# Patient Record
Sex: FEMALE | Race: WHITE
[De-identification: names, ages, dates, MRNs, and addresses within clinical notes are randomized per-mention and may not be internally consistent; named-entity substitution may affect disease eponyms.]

---

## 2017-07-01 ENCOUNTER — HEALTH MAINTENANCE LETTER (OUTPATIENT)
Age: 62
End: 2017-07-01

## 2018-08-02 ENCOUNTER — TRANSFERRED RECORDS (OUTPATIENT)
Dept: HEALTH INFORMATION MANAGEMENT | Facility: CLINIC | Age: 63
End: 2018-08-02

## 2018-08-17 ENCOUNTER — RECORDS - HEALTHEAST (OUTPATIENT)
Dept: LAB | Facility: CLINIC | Age: 63
End: 2018-08-17

## 2018-08-17 LAB
C REACTIVE PROTEIN LHE: <0.1 MG/DL (ref 0–0.8)
CHOLEST SERPL-MCNC: 259 MG/DL
FASTING STATUS PATIENT QL REPORTED: YES
HDLC SERPL-MCNC: 71 MG/DL
LDLC SERPL CALC-MCNC: 171 MG/DL
TRIGL SERPL-MCNC: 87 MG/DL
TSH SERPL DL<=0.005 MIU/L-ACNC: 5.05 UIU/ML (ref 0.3–5)

## 2018-08-20 LAB — 25(OH)D3 SERPL-MCNC: 37.8 NG/ML (ref 30–80)

## 2018-09-10 NOTE — TELEPHONE ENCOUNTER
FUTURE VISIT INFORMATION      FUTURE VISIT INFORMATION:    Date: 9/18/18    Time: 1200    Location: [facility]  REFERRAL INFORMATION:    Referring provider:  N/A    Referring providers clinic:  N/A    Reason for visit/diagnosis  HAMMER TOE, BUNIONS    RECORDS REQUESTED FROM:       Clinic name Comments Records Status Imaging Status   [facility]10[facility]18@1220                                     RECORDS STATUS      RECORDS RECEIVED FROM: [facility]   DATE RECEIVED: 9/11/18   NOTES STATUS DETAILS   OFFICE NOTE from referring provider Received 8/6/18   OFFICE NOTE from other specialist N/A    DISCHARGE SUMMARY from hospital N/A    DISCHARGE REPORT from the ER N/A    OPERATIVE REPORT N/A    MEDICATION LIST N/A    IMPLANT RECORD/STICKER N/A    LABS     CBC/DIFF N/A    CULTURES N/A    INJECTIONS DONE IN RADIOLOGY N/A    MRI N/A    CT SCAN N/A    XRAYS (IMAGES & REPORTS) N/A    TUMOR     PATHOLOGY  Slides & report N/A

## 2018-09-12 DIAGNOSIS — M25.571 PAIN IN JOINT INVOLVING ANKLE AND FOOT, RIGHT: Primary | ICD-10-CM

## 2018-09-18 ENCOUNTER — PRE VISIT (OUTPATIENT)
Dept: ORTHOPEDICS | Facility: CLINIC | Age: 63
End: 2018-09-18

## 2018-11-21 ENCOUNTER — MEDICAL CORRESPONDENCE (OUTPATIENT)
Dept: HEALTH INFORMATION MANAGEMENT | Facility: CLINIC | Age: 63
End: 2018-11-21

## 2018-12-26 ENCOUNTER — MEDICAL CORRESPONDENCE (OUTPATIENT)
Dept: HEALTH INFORMATION MANAGEMENT | Facility: CLINIC | Age: 63
End: 2018-12-26

## 2018-12-26 ENCOUNTER — TRANSFERRED RECORDS (OUTPATIENT)
Dept: HEALTH INFORMATION MANAGEMENT | Facility: CLINIC | Age: 63
End: 2018-12-26

## 2019-01-02 DIAGNOSIS — Z13.6 SCREENING FOR CARDIOVASCULAR CONDITION: Primary | ICD-10-CM

## 2019-01-03 DIAGNOSIS — I65.29 OCCLUSION OF CAROTID ARTERY, UNSPECIFIED LATERALITY: Primary | ICD-10-CM

## 2019-01-03 DIAGNOSIS — R06.02 SHORTNESS OF BREATH: ICD-10-CM

## 2019-01-03 DIAGNOSIS — I70.90 ATHEROSCLEROSIS: ICD-10-CM

## 2019-01-03 DIAGNOSIS — I10 ESSENTIAL HYPERTENSION: ICD-10-CM

## 2019-01-07 ENCOUNTER — RECORDS - HEALTHEAST (OUTPATIENT)
Dept: ADMINISTRATIVE | Facility: OTHER | Age: 64
End: 2019-01-07

## 2019-01-07 ENCOUNTER — OFFICE VISIT (OUTPATIENT)
Dept: CARDIOLOGY | Facility: CLINIC | Age: 64
End: 2019-01-07
Payer: COMMERCIAL

## 2019-01-07 VITALS
HEART RATE: 69 BPM | BODY MASS INDEX: 23.21 KG/M2 | WEIGHT: 131 LBS | OXYGEN SATURATION: 96 % | DIASTOLIC BLOOD PRESSURE: 83 MMHG | SYSTOLIC BLOOD PRESSURE: 121 MMHG | HEIGHT: 63 IN

## 2019-01-07 DIAGNOSIS — Z13.6 SCREENING FOR CARDIOVASCULAR CONDITION: ICD-10-CM

## 2019-01-07 DIAGNOSIS — I70.90 ATHEROSCLEROSIS: ICD-10-CM

## 2019-01-07 DIAGNOSIS — I10 ESSENTIAL HYPERTENSION: ICD-10-CM

## 2019-01-07 DIAGNOSIS — I73.9 PERIPHERAL VASCULAR DISEASE (H): ICD-10-CM

## 2019-01-07 DIAGNOSIS — I65.29 OCCLUSION OF CAROTID ARTERY, UNSPECIFIED LATERALITY: ICD-10-CM

## 2019-01-07 DIAGNOSIS — I10 ESSENTIAL HYPERTENSION: Primary | ICD-10-CM

## 2019-01-07 DIAGNOSIS — R06.02 SHORTNESS OF BREATH: ICD-10-CM

## 2019-01-07 LAB
CHOLEST SERPL-MCNC: 184 MG/DL
CREAT UR-MCNC: 237 MG/DL
CRP SERPL HS-MCNC: 21.3 MG/L
GLUCOSE SERPL-MCNC: 95 MG/DL (ref 70–99)
HDLC SERPL-MCNC: 57 MG/DL
INTERPRETATION ECG - MUSE: NORMAL
LDLC SERPL CALC-MCNC: 105 MG/DL
MICROALBUMIN UR-MCNC: 23 MG/L
MICROALBUMIN/CREAT UR: 9.75 MG/G CR (ref 0–25)
NONHDLC SERPL-MCNC: 127 MG/DL
NT-PROBNP SERPL-MCNC: 84 PG/ML (ref 0–125)
TRIGL SERPL-MCNC: 112 MG/DL

## 2019-01-07 RX ORDER — LOSARTAN POTASSIUM 50 MG/1
50 TABLET ORAL DAILY
Refills: 0 | COMMUNITY
Start: 2018-12-21

## 2019-01-07 RX ORDER — AMLODIPINE BESYLATE 2.5 MG/1
2.5 TABLET ORAL DAILY
Refills: 0 | COMMUNITY
Start: 2018-12-26

## 2019-01-07 ASSESSMENT — MIFFLIN-ST. JEOR: SCORE: 1108.34

## 2019-01-07 NOTE — LETTER
2019      RE: Farrah Rausch  [address]  [place] [address]       Dear Colleague,    Thank you for the opportunity to participate in the care of your patient, Farrah Rausch, at the [facility] at [facility]. Please see a copy of my visit note below.    [facility] for Cardiovascular Disease Prevention - Exam Note    Active Problems   Patient Active Problem List    Diagnosis Date Noted     Carotid artery occlusion      Priority: Medium     Hypertension      Priority: Medium     Shortness of breath      Priority: Medium       Reason For Visit   Patient here for [facility] early detection of atherosclerosis and CVD exam.    Pain Evaluation  Current history of pain associated with this visit is: denied    HPI   Farrah Rausch is a 63 year old year old female with a history of childhood cancer (Hodgkins) age 4 with two series of radiation (one spring and one summer) the the upper chest and neck treated at [facility], carotid artery occlusion complete left side and 70-99% right side dx , hypothyroid, hyperlipidemia and hypertension. .Radiation has also effected her teeth with decay and she is getting dental care at [facility]. In the fall she added amlodipine 2.5 mg per day and losartan 50 mg per day with improvement in her numbers ( upt to 200/130) now in the  As low as 80/51 and as high as 144/96.     She had a chest CT in  with pulmonary nodules 7x6 not frankly malignant features and six additional 3 mm or less nodules. She has not yet followed up with the lung CT or carotid ultrasound. She continues to smoke 3 cigarettes per day even though she knows it is best to quit. She is not interested in nicotine replacement. She feels that she was traumatized by caring for her mother when she was ill and her brother both who have . She has seen counselors in the past. She does have shortness of breath with  exertion.    Nutrition assessment per patient report:   Foods with fat/cholesterol (fried foods, fatty meats, junk food):  1 serving per day , not much meat  Fruits and vegetables (  cup cooked, 1 cup raw):  1 serving per day  Caffeine (1 cup coffee, soda, etc):  3 servings per day coffee  Alcohol servings (12 oz. beer, 4 oz. wine, 1  oz. in mixed drink):  0 servings  Calcium servings (dairy foods, 8 oz. milk, yogurt, cheese, ice cream):  1.5 glass of milk, cheese and yogurt  Salt/sodium use:  rarely  Special dietary habits:  None    Activity  Patient is active around her home. She knows it would be good to walk more but she does not always find time to do this. She takes care of her own yard and lives in a house.    Laboratory Results Review  We discussed laboratory results today including lipids targets and how foods influence cholesterol.    Weight  Her perceived healthy weight is 120 pounds.  A normal BMI of 25 is equal to 141 pounds.  The current BMI of 23.2 is normal weight range.     PMH   Past Medical History:   Diagnosis Date     Carotid artery occlusion      Hypertension      Shortness of breath      Tobacco use        PSH  No past surgical history on file.    Current Meds   Current Outpatient Medications   Medication Sig Dispense Refill     Ergocalciferol (VITAMIN D2 PO)        levothyroxine (SYNTHROID) 125 MCG tablet Take 125 mcg by mouth daily       sertraline (ZOLOFT) 25 MG tablet Take 25 mg by mouth daily         Allergies      Allergies   Allergen Reactions     Thyroid      Valium [Diazepam]        Family Hx   No family history on file.    Social History  Farrah is retired.  She is single with no children.       Tobacco History  History   Smoking Status     Passive Smoke Exposure - Never Smoker     Types: Cigarettes   Smokeless Tobacco     Never Used       ROS  CONSTITUTIONAL:  No fever, chills, or sweats. No weight gain/loss.   EENT:  No visual disturbance, ear ache, epistaxis, sore  "throat  ALLERGIES/IMMUNOLOGIC:  Negative  RESPIRATORY:  No cough, hemoptysis  CARDIOVASCULAR:  As per HPI  GI:  No nausea, vomiting, hematemesis, melena  :  No urinary frequency, dysuria, or hematuria  INTEGUMENT:  Negative  PSYCHIATRIC:  Negative  NEURO:  Negative  ENDOCRINE:  Negative  MUSCULOSKELETAL:  Negative     Vital Signs   /83 (BP Location: Right arm, Patient Position: Sitting, Cuff Size: Adult Regular)   Pulse 69   Ht 1.6 m (5' 3\")   Wt 59.4 kg (131 lb)   SpO2 96%   BMI 23.21 kg/m         Waist: 31 inches  Hip: 37.5 inches    Physical Exam   In general, the patient is a pleasant female in no apparent distress.    HEENT: NC/AT.  PERRLA.  EOMI.  Sclerae white, not injected.  Nares clear.  Pharynx without erythema or exudate.  Dentition intact.    Neck: No adenopathy.  No thyromegaly.Carotids +4/4 bilaterally without bruits.  No jugular venous distension.   Lungs: CTA.  No ronchi, wheezes, rales.     Cor: RRR. Normal S1, S2 splits physiologically. No murmur, rub, click, or gallop. The PMI is in the 5th ICS in the midclavicular line. There is no heave.   Abdomen: Soft, nontender, nondistended. No organomegaly.  No bruits.   Extremities: No clubbing, cyanosis, or edema. The pulses are +2/2 at the post-tibial sites bilaterally. No bruits are noted.    Recent Labs  Lab Results   Component Value Date    GLC 95 01/07/2019      Lab Results   Component Value Date    NTBNP 84 01/07/2019     No results found for: NTBNPI   Lab Results   Component Value Date    UCRR 237 01/07/2019      Lab Results   Component Value Date    MICROL 23 01/07/2019      No results found for: MICROALBUMIN   Lab Results   Component Value Date    CRP 21.3 01/07/2019      Lab Results   Component Value Date    CHOL 184 01/07/2019      Lab Results   Component Value Date    TRIG 112 01/07/2019      Lab Results   Component Value Date    HDL 57 01/07/2019      Lab Results   Component Value Date     (H) 01/07/2019      No results " found for: VLDL   No results found for: CHOLHDLRATIO  Lab Results   Component Value Date    [facility] 127 01/07/2019          [facility] Test Results    BASIC SPIROMETRY: Summary of two attempts (see printout for details of results)  Results Estimated range for ht/age   FVC: 2.89 liter FVC: 1.50-2.89 liter   FEV1: 2.66 liter FEV1: 1.84-3.39 liter     History of asthma:  NO. + History of smoking.    History of respiratory infection current/recent:  NO    Spirometry Results:  normal      WALKING BLOOD PRESSURE RESPONSE (3 minute, 5 MET level walk)   Pre BP: 100/70 mmHg  3 min BP: 180/60 mmHg  1 min post BP: 160/70 mmHg    Pre HR: 76 bpm  3 min HR: 107 bpm  1 min post HR: 85 bpm   Test was stopped after 2 minutes walking. Test abnormal.     RETINAL VASCULAR ASSESSMENT   Left Eye Abnormality:  none  AV Ratio: 0.86    Right Eye Abnormality:  none  AV Ratio: 0.94     Retinal Assessment:  normal      ABDOMINAL AORTA ULTRASOUND (< 2.5 normal, borderline 2.5-2.9, abnormal > 3)   SupraIliac 2.24 cm    SupraRenal 2.08 cm    InfraRenal Proximal 2.01 cm    InfraRenal Distal 1.71 cm      Abdominal Aorta Assessment:  normal      LEFT VENTRICULAR ULTRASOUND MEASUREMENTS (adjusted for BSA)  LVIDD 43.4 mm   Septa 10.2 mm   Posterior 7.6 mm     Left Ventricular US Assessment:  normal      Carotid Artery IMT measurements report and plaques in the small area examined:   Left IMT N/A mm  Plaques left Common part of the artery and bulb and internal has a lot of disease. Was not able to perform any measurements.     Right IMT 1.328 mm  Plaques a lof of heterogeneous build up.        ECG (see tracing):  normal sinus rhythm;  rate: 69 bpm      Arterial Elasticity per age and gender (see printout):   C1 10.3 mL/mmHg x 10  normal   C2 2 mL/mmHg x 100 abnormal   Supine blood pressure: 128/79 mmHg       Assessment:     Cardiovascular:  Hx of left carotid occlusion, chest and neck radiation age 4 and ongoing tobacco use. ECG sinus rhythm rate 69. No  chest pain but she has had episodic shortness of breath more last summer. Nt pro bNP normal at 84. Some intermittant dizziness more when she is over dressed and too hot. She continues to use tobacco 3 cigarettes per day. She declined assistance with tobacco cessation at this time. She is not on aspirin therapy which may be helpful to reduce risk of vascular occlusions. Unable to visualize her carotid IMT today due to poor visibility related to radiation and plaque. Left per 2014 ultrasound CCA, ICA, ECA left occluded, 70-99% right internal stenosis. She says she does not want to have carotid surgery. No visible coronary calcification stated with chest CT in 2014. She has not had follow up for pulmonary nodules. Consideration of referral to pulmonary nodule clinic was mentioned in her last report. Recommend that she follow up with pulmonary nodule clinic, will discuss with her after team review obtaining CT before consultation with [facility].      Blood Pressure:  Sitting blood pressure with amlodipine 2.5 mg per day and losartan 50 mg per day at 121/83.  Exercise blood pressure significantly elevated consistent with low compliance. Some BP rise with exertion may also be related to vascular stenosis (carotid) and possibly other areas. Unable to visualize her carotid IMT today due to poor visibility related to radiation and plaque.    Lipids:  Her LDL was elevated at 171 8-2018 non fasting per her report. Today's LDL with change from whole milk to 2% is lower at 105, HDL 57 and triglyceride 112.    Health Habit Summary:  Nutrition: Heart Healthy Eating:  some of the time   Exercise:  not regularly active  Weight:  normal weight range  Tobacco Use:  3 cigarettes per day.    Full report to follow prevention team review of test results with scanned final report.    Time spent for patient visit was 60 minutes with more than half the time spent on counseling and coordination of care.    ANUJA Stacy CNP       CC  Patient  Care Team:  Kerry Wahl MD as PCP - General (Family Practice)  Clinic, [facility]  [place] DUMONT

## 2019-01-07 NOTE — PROGRESS NOTES
[facility] for Cardiovascular Disease Prevention - Exam Note    Active Problems   Patient Active Problem List    Diagnosis Date Noted     Carotid artery occlusion      Priority: Medium     Hypertension      Priority: Medium     Shortness of breath      Priority: Medium       Reason For Visit   Patient here for [facility] early detection of atherosclerosis and CVD exam.    Pain Evaluation  Current history of pain associated with this visit is: denied    HPI   Farrah Rausch is a 63 year old year old female with a history of childhood cancer (Hodgkins) age 4 with two series of radiation (one spring and one summer) the the upper chest and neck treated at [place], carotid artery occlusion complete left side and 70-99% right side dx 2013, hypothyroid, hyperlipidemia and hypertension. .Radiation has also effected her teeth with decay and she is getting dental care at [facility]. In the fall she added amlodipine 2.5 mg per day and losartan 50 mg per day with improvement in her numbers ( upt to 200/130) now in the  As low as 80/51 and as high as 144/96.     She had a chest CT in  with pulmonary nodules 7x6 not frankly malignant features and six additional 3 mm or less nodules. She has not yet followed up with the lung CT or carotid ultrasound. She continues to smoke 3 cigarettes per day even though she knows it is best to quit. She is not interested in nicotine replacement. She feels that she was traumatized by caring for her mother when she was ill and her brother both who have . She has seen counselors in the past. She does have shortness of breath with exertion.    Nutrition assessment per patient report:   Foods with fat/cholesterol (fried foods, fatty meats, junk food):  1 serving per day , not much meat  Fruits and vegetables (  cup cooked, 1 cup raw):  1 serving per day  Caffeine (1 cup coffee, soda, etc):  3 servings per day coffee  Alcohol servings (12 oz. beer, 4 oz. wine, 1  oz. in mixed drink):   0 servings  Calcium servings (dairy foods, 8 oz. milk, yogurt, cheese, ice cream):  1.5 glass of milk, cheese and yogurt  Salt/sodium use:  rarely  Special dietary habits:  None    Activity  Patient is active around her home. She knows it would be good to walk more but she does not always find time to do this. She takes care of her own yard and lives in a house.    Laboratory Results Review  We discussed laboratory results today including lipids targets and how foods influence cholesterol.    Weight  Her perceived healthy weight is 120 pounds.  A normal BMI of 25 is equal to 141 pounds.  The current BMI of 23.2 is normal weight range.     PMH   Past Medical History:   Diagnosis Date     Carotid artery occlusion      Hypertension      Shortness of breath      Tobacco use        PSH  No past surgical history on file.    Current Meds   Current Outpatient Medications   Medication Sig Dispense Refill     Ergocalciferol (VITAMIN D2 PO)        levothyroxine (SYNTHROID) 125 MCG tablet Take 125 mcg by mouth daily       sertraline (ZOLOFT) 25 MG tablet Take 25 mg by mouth daily         Allergies      Allergies   Allergen Reactions     Thyroid      Valium [Diazepam]        Family Hx   No family history on file.    Social History  Farrah is retired.  She is single with no children.       Tobacco History  History   Smoking Status     Passive Smoke Exposure - Never Smoker     Types: Cigarettes   Smokeless Tobacco     Never Used       ROS  CONSTITUTIONAL:  No fever, chills, or sweats. No weight gain/loss.   EENT:  No visual disturbance, ear ache, epistaxis, sore throat  ALLERGIES/IMMUNOLOGIC:  Negative  RESPIRATORY:  No cough, hemoptysis  CARDIOVASCULAR:  As per HPI  GI:  No nausea, vomiting, hematemesis, melena  :  No urinary frequency, dysuria, or hematuria  INTEGUMENT:  Negative  PSYCHIATRIC:  Negative  NEURO:  Negative  ENDOCRINE:  Negative  MUSCULOSKELETAL:  Negative     Vital Signs   /83 (BP Location: Right arm,  "Patient Position: Sitting, Cuff Size: Adult Regular)   Pulse 69   Ht 1.6 m (5' 3\")   Wt 59.4 kg (131 lb)   SpO2 96%   BMI 23.21 kg/m        Waist: 31 inches  Hip: 37.5 inches    Physical Exam   In general, the patient is a pleasant female in no apparent distress.    HEENT: NC/AT.  PERRLA.  EOMI.  Sclerae white, not injected.  Nares clear.  Pharynx without erythema or exudate.  Dentition intact.    Neck: No adenopathy.  No thyromegaly.Carotids +4/4 bilaterally without bruits.  No jugular venous distension.   Lungs: CTA.  No ronchi, wheezes, rales.     Cor: RRR. Normal S1, S2 splits physiologically. No murmur, rub, click, or gallop. The PMI is in the 5th ICS in the midclavicular line. There is no heave.   Abdomen: Soft, nontender, nondistended. No organomegaly.  No bruits.   Extremities: No clubbing, cyanosis, or edema. The pulses are +2/2 at the post-tibial sites bilaterally. No bruits are noted.    Recent Labs  Lab Results   Component Value Date    GLC 95 01/07/2019      Lab Results   Component Value Date    NTBNP 84 01/07/2019     No results found for: NTBNPI   Lab Results   Component Value Date    UCRR 237 01/07/2019      Lab Results   Component Value Date    MICROL 23 01/07/2019      No results found for: MICROALBUMIN   Lab Results   Component Value Date    CRP 21.3 01/07/2019      Lab Results   Component Value Date    CHOL 184 01/07/2019      Lab Results   Component Value Date    TRIG 112 01/07/2019      Lab Results   Component Value Date    HDL 57 01/07/2019      Lab Results   Component Value Date     (H) 01/07/2019      No results found for: VLDL   No results found for: CHOLHDLRATIO  Lab Results   Component Value Date    NHDL 127 01/07/2019          [facility] Test Results    BASIC SPIROMETRY: Summary of two attempts (see printout for details of results)  Results Estimated range for ht/age   FVC: 2.89 liter FVC: 1.50-2.89 liter   FEV1: 2.66 liter FEV1: 1.84-3.39 liter     History of asthma:  NO. + " History of smoking.    History of respiratory infection current/recent:  NO    Spirometry Results:  normal      WALKING BLOOD PRESSURE RESPONSE (3 minute, 5 MET level walk)   Pre BP: 100/70 mmHg  3 min BP: 180/60 mmHg  1 min post BP: 160/70 mmHg    Pre HR: 76 bpm  3 min HR: 107 bpm  1 min post HR: 85 bpm   Test was stopped after 2 minutes walking. Test abnormal.     RETINAL VASCULAR ASSESSMENT   Left Eye Abnormality:  none  AV Ratio: 0.86    Right Eye Abnormality:  none  AV Ratio: 0.94     Retinal Assessment:  normal      ABDOMINAL AORTA ULTRASOUND (< 2.5 normal, borderline 2.5-2.9, abnormal > 3)   SupraIliac 2.24 cm    SupraRenal 2.08 cm    InfraRenal Proximal 2.01 cm    InfraRenal Distal 1.71 cm      Abdominal Aorta Assessment:  normal      LEFT VENTRICULAR ULTRASOUND MEASUREMENTS (adjusted for BSA)  LVIDD 43.4 mm   Septa 10.2 mm   Posterior 7.6 mm     Left Ventricular US Assessment:  normal      Carotid Artery IMT measurements report and plaques in the small area examined:   Left IMT N/A mm  Plaques left Common part of the artery and bulb and internal has a lot of disease. Was not able to perform any measurements.     Right IMT 1.328 mm  Plaques a lof of heterogeneous build up.        ECG (see tracing):  normal sinus rhythm;  rate: 69 bpm      Arterial Elasticity per age and gender (see printout):   C1 10.3 mL/mmHg x 10  normal   C2 2 mL/mmHg x 100 abnormal   Supine blood pressure: 128/79 mmHg       Assessment:     Cardiovascular:  Hx of left carotid occlusion, chest and neck radiation age 4 and ongoing tobacco use. ECG sinus rhythm rate 69. No chest pain but she has had episodic shortness of breath more last summer. Nt pro bNP normal at 84. Some intermittant dizziness more when she is over dressed and too hot. She continues to use tobacco 3 cigarettes per day. She declined assistance with tobacco cessation at this time. She is not on aspirin therapy which may be helpful to reduce risk of vascular occlusions.  Unable to visualize her carotid IMT today due to poor visibility related to radiation and plaque. Left per 2014 ultrasound CCA, ICA, ECA left occluded, 70-99% right internal stenosis. She says she does not want to have carotid surgery. No visible coronary calcification stated with chest CT in 2014. She has not had follow up for pulmonary nodules. Consideration of referral to pulmonary nodule clinic was mentioned in her last report. Recommend that she follow up with pulmonary nodule clinic, will discuss with her after team review obtaining CT before consultation with [facility].      Blood Pressure:  Sitting blood pressure with amlodipine 2.5 mg per day and losartan 50 mg per day at 121/83.  Exercise blood pressure significantly elevated consistent with low compliance. Some BP rise with exertion may also be related to vascular stenosis (carotid) and possibly other areas. Unable to visualize her carotid IMT today due to poor visibility related to radiation and plaque.    Lipids:  Her LDL was elevated at 171 8-2018 non fasting per her report. Today's LDL with change from whole milk to 2% is lower at 105, HDL 57 and triglyceride 112.    Health Habit Summary:  Nutrition: Heart Healthy Eating:  some of the time   Exercise:  not regularly active  Weight:  normal weight range  Tobacco Use:  3 cigarettes per day.    Full report to follow prevention team review of test results with scanned final report.    Time spent for patient visit was 60 minutes with more than half the time spent on counseling and coordination of care.    ANUJA Stacy CNP       CC  Patient Care Team:  Kerry Wahl MD as PCP - General (Family Practice)  Clinic, [facility]  [place]

## 2019-01-18 ENCOUNTER — TELEPHONE (OUTPATIENT)
Dept: CARDIOLOGY | Facility: CLINIC | Age: 64
End: 2019-01-18

## 2019-01-18 DIAGNOSIS — R91.8 PULMONARY NODULES: Primary | ICD-10-CM

## 2019-01-18 NOTE — TELEPHONE ENCOUNTER
Discussed treatment with a statin which she declines. She did agree to follow up with pulmonary nodule clinic. Phone number provided.

## 2019-01-29 ENCOUNTER — TELEPHONE (OUTPATIENT)
Dept: SURGERY | Facility: CLINIC | Age: 64
End: 2019-01-29

## 2019-01-29 ENCOUNTER — TELEPHONE (OUTPATIENT)
Dept: PULMONOLOGY | Facility: CLINIC | Age: 64
End: 2019-01-29

## 2019-01-29 DIAGNOSIS — R91.1 LUNG NODULE: Primary | ICD-10-CM

## 2019-01-29 NOTE — TELEPHONE ENCOUNTER
Left message for patient to call and schedule Ct per order & Inbasket prior to visit w/ Dr. Tapia

## 2019-01-29 NOTE — TELEPHONE ENCOUNTER
ONCOLOGY INTAKE: Records Information      APPT INFORMATION: 02/20 w/Dr. Tapia at [facility] okay per pt for later date  Referring provider:  Reena Martínez APRN CNP  Referring provider s clinic:  NA  Reason for visit/diagnosis:  Pulmonary nodules [R91.8]:    Were the records received with the referral (via [employer])? No     Has patient been seen for any external appt for this diagnosis (enter clinic/location)? Yes / [facility]     Dx:Pulmonary nodules [R91.8]: Caller pt: Ref by:Reena Martínez APRN CNP: CT at [facility] 2014 No Bx  Okay per pt to schedule for later date

## 2019-01-29 NOTE — TELEPHONE ENCOUNTER
RECORDS STATUS - ALL OTHER DIAGNOSIS      RECORDS RECEIVED FROM: [facility]   DATE RECEIVED:    NOTES STATUS DETAILS   OFFICE NOTE from referring provider     OFFICE NOTE from medical oncologist     DISCHARGE SUMMARY from hospital     DISCHARGE REPORT from the ER     OPERATIVE REPORT     MEDICATION LIST     CLINICAL TRIAL TREATMENTS TO DATE     LABS     PATHOLOGY REPORTS     ANYTHING RELATED TO DIAGNOSIS     GENONOMIC TESTING     TYPE:     IMAGING (NEED IMAGES & REPORT)     CT SCANS 9/22/2014 ([facility], Requested) Called pt 1/29/19 and left VM - Pt needs to give verbal permission to [facility] for them to push the img to us. Left ph # for [facility], and advised pt to speak w/ Cassidy.   MRI     MAMMO     ULTRASOUND     PET

## 2019-01-29 NOTE — TELEPHONE ENCOUNTER
Per IB - I've ordered CT; I've requested 2014 lung imaging from [facility] [phone] medical records, but they need patient to call them to authorize pushing those images to us (?).         Please call patient with this information, and tell her that seeing MD with no updated imaging will not be very useful but if she insists, that is OK.   If able to get CT scheduled later in day, please do because we will be needing this eventually.     Called [facility], spoke w/ Cassidy. Per Cassidy, pt hasn't been seen since 9/22/14 & needs to call them to give a verbal authorization for them to push img to us. I left VM @ pt's phone advising them that [facility] needs verbal permission, advised pt to call [phone] & ask for Cassidy, and gave pt DOS of 9/22/14, and left her w/ my direct line as well, in case she has any questions.  2:53 PM

## 2019-01-31 ENCOUNTER — DOCUMENTATION ONLY (OUTPATIENT)
Dept: CARE COORDINATION | Facility: CLINIC | Age: 64
End: 2019-01-31

## 2019-02-14 ENCOUNTER — ANCILLARY PROCEDURE (OUTPATIENT)
Dept: CT IMAGING | Facility: CLINIC | Age: 64
End: 2019-02-14
Payer: MEDICARE

## 2019-02-14 DIAGNOSIS — R91.1 LUNG NODULE: ICD-10-CM

## 2019-02-20 ENCOUNTER — PRE VISIT (OUTPATIENT)
Dept: PULMONOLOGY | Facility: CLINIC | Age: 64
End: 2019-02-20

## 2019-03-04 NOTE — TELEPHONE ENCOUNTER
RECORDS STATUS - ALL OTHER DIAGNOSIS      RECORDS RECEIVED FROM: [facility]   DATE RECEIVED: 3/4/19   NOTES STATUS DETAILS   OFFICE NOTE from referring provider  Epic   OFFICE NOTE from medical oncologist NA    DISCHARGE SUMMARY from hospital NA    DISCHARGE REPORT from the ER NA    OPERATIVE REPORT NA    MEDICATION LIST  [facility]   CLINICAL TRIAL TREATMENTS TO DATE NA    LABS     PATHOLOGY REPORTS NA    ANYTHING RELATED TO DIAGNOSIS  Epic   GENONOMIC TESTING     TYPE: NA    IMAGING (NEED IMAGES & REPORT)     CT SCANS 2/14/19, 9/22/14 PACS   MRI NA    MAMMO NA    ULTRASOUND 9/22/14 PACS   PET NA

## 2019-03-06 ENCOUNTER — PRE VISIT (OUTPATIENT)
Dept: PULMONOLOGY | Facility: CLINIC | Age: 64
End: 2019-03-06

## 2019-03-06 ENCOUNTER — OFFICE VISIT (OUTPATIENT)
Dept: SURGERY | Facility: CLINIC | Age: 64
End: 2019-03-06
Attending: THORACIC SURGERY (CARDIOTHORACIC VASCULAR SURGERY)
Payer: MEDICARE

## 2019-03-06 ENCOUNTER — RECORDS - HEALTHEAST (OUTPATIENT)
Dept: ADMINISTRATIVE | Facility: OTHER | Age: 64
End: 2019-03-06

## 2019-03-06 VITALS
TEMPERATURE: 97 F | WEIGHT: 129.2 LBS | OXYGEN SATURATION: 99 % | SYSTOLIC BLOOD PRESSURE: 120 MMHG | HEART RATE: 87 BPM | HEIGHT: 63 IN | DIASTOLIC BLOOD PRESSURE: 78 MMHG | RESPIRATION RATE: 16 BRPM | BODY MASS INDEX: 22.89 KG/M2

## 2019-03-06 DIAGNOSIS — Z72.0 TOBACCO USE: ICD-10-CM

## 2019-03-06 DIAGNOSIS — Z12.2 ENCOUNTER FOR SCREENING FOR LUNG CANCER: ICD-10-CM

## 2019-03-06 DIAGNOSIS — R91.1 PULMONARY NODULE: Primary | ICD-10-CM

## 2019-03-06 PROCEDURE — G0463 HOSPITAL OUTPT CLINIC VISIT: HCPCS | Mod: ZF

## 2019-03-06 RX ORDER — HYDROCHLOROTHIAZIDE 12.5 MG/1
CAPSULE ORAL PRN
Refills: 0 | COMMUNITY
Start: 2018-11-20

## 2019-03-06 ASSESSMENT — MIFFLIN-ST. JEOR: SCORE: 1095.05

## 2019-03-06 ASSESSMENT — PAIN SCALES - GENERAL: PAINLEVEL: NO PAIN (0)

## 2019-03-06 NOTE — PROGRESS NOTES
LUNG NODULE & INTERVENTIONAL PULMONARY CLINIC  CLINICS & SURGERY CENTER, [facility], [facility]     Farrah Rausch MRN# [id number]   Age: 64 year old YOB: 1955     Reason for Consultation: lung nodule(s)    Requesting Physician: Nilam Paredes MD  No address on file       Assessment and Plan:    1. Established solitary pulmonary lung nodule(s). Given the characteristics on current/previous imaging and risk factors; I would classify this to be Low (<6%) risk for cancer. Farrah also has several small granulomas that are calcified.    2. Tobacco use: currently smoking.  Not interested in quitting.    3. You qualify for lung cancer screening.  We can start next year.    Lung Cancer Screening Shared Decision Making Visit     Farrah Rausch is eligible for lung cancer screening on the basis of:   has not not experienced symptoms suggestive of lung cancer.   born on 1955, 64 year old years old.   smoked > 30 pack-years   has not quit smoking is currently smoking.    I have discussed with patient the risks and benefits of screening for lung cancer with low-dose CT.     The risks include:   radiation exposure    false positives     over-diagnosis    The benefit of early detection of lung cancer is contingent upon adherence to annual screening or more frequent follow up if indicated.     Furthermore, reaping the benefits of screening requires Farrah Rausch to be willing and able to undergo diagnostic procedures, if indicated.     We did discuss that the only way to prevent lung cancer is to not smoke. Smoking cessation assistance was offered.             History:     Farrah Rausch is a 64 year old female with sig h/o for radiation therapy as a child for lymphoma who is here for evaluation/followup of lung nodule(s).  No provocative, palliative or localizing factors.  No pain.    - No new resp sx or complaints. Denies dyspnea or cough.   - Personal hx of cancer:  lymphoma  - Tobacco hx: Current Smoker: occasional for years up to currently.  >30 pack years  - My interpretation of the images relevant for this visit includes: small LLL lung nodule.  Stable relative to previous CT scan.     Culprit Nodule(s):   1: Left lower lobe nodule and is 6x7 mm in size/severity and non-calcified in morphology/quality. First seen by chest CT on 2014. There is no interval change.    Other nodules:   1: multiple small granulomata           Past Medical History:      Past Medical History:   Diagnosis Date     Carotid artery occlusion      H/O bilateral breast implants      Hyperlipidemia      Hypertension 2015     Shortness of breath      Stress disorder, post traumatic     caring for mother and brother and there eventual deaths.     Tobacco use            Past Surgical History:    No past surgical history on file.       Social History:     Social History     Tobacco Use     Smoking status: Passive Smoke Exposure - Never Smoker     Smokeless tobacco: Never Used   Substance Use Topics     Alcohol use: Yes     Comment: rarely          Family History:     Family History   Problem Relation Age of Onset     Pulmonary Embolism Mother      Cerebrovascular Disease Mother      Lung Cancer Father         hx of tobacco,      Schizophrenia Brother      Cancer Maternal Grandfather         ? cancer     Hypertension Brother         lives in [place]           Allergies:      Allergies   Allergen Reactions     Lisinopril      Knees buckleled, dizzy     Thyroid      Generic palpitations     Valium [Diazepam] Nausea and Vomiting          Medications:     Current Outpatient Medications   Medication Sig     amLODIPine (NORVASC) 2.5 MG tablet Take 2.5 mg by mouth daily     hydrochlorothiazide (MICROZIDE) 12.5 MG capsule as needed     levothyroxine (SYNTHROID) 125 MCG tablet Take 125 mcg by mouth daily     losartan (COZAAR) 50 MG tablet Take 50 mg by mouth daily     Ergocalciferol (VITAMIN D2 PO) Take 10,000 Units  by mouth once a week      No current facility-administered medications for this visit.           Review of Systems:     CONSTITUTIONAL: negative for fever, chills, change in weight  INTEGUMENTARY/SKIN: no rash or obvious new lesions  ENT/MOUTH: no sore throat, new sinus pain or nasal drainage  RESP: see interval history  CV: negative for chest pain, palpitations or peripheral edema  GI: no nausea, vomiting, change in stools  : no dysuria  MUSCULOSKELETAL: no myalgias, arthralgias  ENDOCRINE: blood sugars with adequate control  PSYCHIATRIC: mood stable  LYMPHATIC: no new lymphadenopathy  HEME: no bleeding or easy bruisability  NEURO: no numbness, weakness, headaches         Physical Exam:     Temp:  [97  F (36.1  C)] 97  F (36.1  C)  Pulse:  [87] 87  Resp:  [16] 16  BP: (120)/(78) 120/78  SpO2:  [99 %] 99 %  Wt Readings from Last 4 Encounters:   03/06/19 58.6 kg (129 lb 3.2 oz)   01/07/19 59.4 kg (131 lb)     Constitutional:   Awake, alert and in no apparent distress     Eyes:   Nonicteric, MORIAH     ENT:    Trachea is midline. No gross neck abnormalities, poor dentition 2/2 childhood.     Neck:   Supple without supraclavicular or cervical lymphadenopathy     Lungs:   Good air flow.  No crackles. No rhonchi.  No wheezes.     Cardiovascular:   Normal S1 and S2.  RRR.  No murmur, gallop or rub.  Radial, DP and PT pulses normal and symmetric     Abdomen:   NABS, soft, nontender, nondistended.  No HSM.     Musculoskeletal:   No edema.      Neurologic:   Alert and conversant. Cranial nerves  intact.       Skin:   Warm, dry.  No rash on limited exam.           Current Laboratory Data:   All laboratory and imaging data reviewed.    No results found for this or any previous visit (from the past 24 hour(s)).         Previous Pulmonary Function Testing   No results found for: 20001  No results found for: 20002  No results found for: 20003  No results found for: 20015  No results found for: 20016  No results found for:  20027  No results found for: 20028  No results found for: 20029  No results found for: 20079  No results found for: 20080  No results found for: 20081  No results found for: 20335  No results found for: 20105  No results found for: 20053  No results found for: 20054  No results found for: 20055         Previous Chest Imaging   CT scans from 2014 and recently interpreted by me.  Both have approx 6-7 mm RLL nodule.  Stable.  Small granulomata.         Previous Cardiology Imaging   No results found for this or any previous visit (from the past 8760 hour(s)).

## 2019-03-06 NOTE — NURSING NOTE
"Oncology Rooming Note    March 6, 2019 1:05 PM   Farrah Rausch is a 64 year old female who presents for:    Chief Complaint   Patient presents with     Oncology Clinic Visit     new pt pulmonary nodules     Initial Vitals: /78 (BP Location: Right arm, Patient Position: Sitting, Cuff Size: Adult Regular)   Pulse 87   Temp 97  F (36.1  C) (Oral)   Resp 16   Ht 1.6 m (5' 2.99\")   Wt 58.6 kg (129 lb 3.2 oz)   SpO2 99%   BMI 22.89 kg/m   Estimated body mass index is 22.89 kg/m  as calculated from the following:    Height as of this encounter: 1.6 m (5' 2.99\").    Weight as of this encounter: 58.6 kg (129 lb 3.2 oz). Body surface area is 1.61 meters squared.  No Pain (0) Comment: Data Unavailable   No LMP recorded.  Allergies reviewed: Yes  Medications reviewed: Yes    Medications: Medication refills not needed today.  Pharmacy name entered into [facility]: [facility] - [place], [place] - [address]    Clinical concerns: none        Sheila Clemente, CMA              "

## 2019-03-06 NOTE — LETTER
RE: Farrah Rausch  [address]  [place] [address]     Dear Colleague,    Thank you for referring your patient, Farrah Rausch, to the [facility]. Please see a copy of my visit note below.    LUNG NODULE & INTERVENTIONAL PULMONARY CLIN[facility]     Farrah Rausch MRN# [id number]   Age: 64 year old YOB: 1955     Reason for Consultation: lung nodule(s)    Requesting Physician: Referred Self, MD  No address on file       Assessment and Plan:    1. Established solitary pulmonary lung nodule(s). Given the characteristics on current/previous imaging and risk factors; I would classify this to be Low (<6%) risk for cancer. Farrah also has several small granulomas that are calcified.    2. Tobacco use: currently smoking.  Not interested in quitting.    3. You qualify for lung cancer screening.  We can start next year.    Lung Cancer Screening Shared Decision Making Visit     Farrah Rausch is eligible for lung cancer screening on the basis of:   has not not experienced symptoms suggestive of lung cancer.   born on 1955, 64 year old years old.   smoked > 30 pack-years   has not quit smoking is currently smoking.    I have discussed with patient the risks and benefits of screening for lung cancer with low-dose CT.     The risks include:   radiation exposure    false positives     over-diagnosis    The benefit of early detection of lung cancer is contingent upon adherence to annual screening or more frequent follow up if indicated.     Furthermore, reaping the benefits of screening requires Farrah Rausch to be willing and able to undergo diagnostic procedures, if indicated.     We did discuss that the only way to prevent lung cancer is to not smoke. Smoking cessation assistance was offered.           History:     Farrah Rausch is a 64 year old female with sig h/o for radiation therapy as  a child for lymphoma who is here for evaluation/followup of lung nodule(s).  No provocative, palliative or localizing factors.  No pain.    - No new resp sx or complaints. Denies dyspnea or cough.   - Personal hx of cancer: lymphoma  - Tobacco hx: Current Smoker: occasional for years up to currently.  >30 pack years  - My interpretation of the images relevant for this visit includes: small LLL lung nodule.  Stable relative to previous CT scan.     Culprit Nodule(s):   1: Left lower lobe nodule and is 6x7 mm in size/severity and non-calcified in morphology/quality. First seen by chest CT on 2014. There is no interval change.    Other nodules:   1: multiple small granulomata           Past Medical History:      Past Medical History:   Diagnosis Date     Carotid artery occlusion      H/O bilateral breast implants      Hyperlipidemia      Hypertension 2015     Shortness of breath      Stress disorder, post traumatic     caring for mother and brother and there eventual deaths.     Tobacco use            Past Surgical History:    No past surgical history on file.       Social History:     Social History     Tobacco Use     Smoking status: Passive Smoke Exposure - Never Smoker     Smokeless tobacco: Never Used   Substance Use Topics     Alcohol use: Yes     Comment: rarely          Family History:     Family History   Problem Relation Age of Onset     Pulmonary Embolism Mother      Cerebrovascular Disease Mother      Lung Cancer Father         hx of tobacco,      Schizophrenia Brother      Cancer Maternal Grandfather         ? cancer     Hypertension Brother         lives in [place]           Allergies:      Allergies   Allergen Reactions     Lisinopril      Knees buckleled, dizzy     Thyroid      Generic palpitations     Valium [Diazepam] Nausea and Vomiting          Medications:     Current Outpatient Medications   Medication Sig     amLODIPine (NORVASC) 2.5 MG tablet Take 2.5 mg by mouth daily     hydrochlorothiazide  (MICROZIDE) 12.5 MG capsule as needed     levothyroxine (SYNTHROID) 125 MCG tablet Take 125 mcg by mouth daily     losartan (COZAAR) 50 MG tablet Take 50 mg by mouth daily     Ergocalciferol (VITAMIN D2 PO) Take 10,000 Units by mouth once a week      No current facility-administered medications for this visit.           Review of Systems:     CONSTITUTIONAL: negative for fever, chills, change in weight  INTEGUMENTARY/SKIN: no rash or obvious new lesions  ENT/MOUTH: no sore throat, new sinus pain or nasal drainage  RESP: see interval history  CV: negative for chest pain, palpitations or peripheral edema  GI: no nausea, vomiting, change in stools  : no dysuria  MUSCULOSKELETAL: no myalgias, arthralgias  ENDOCRINE: blood sugars with adequate control  PSYCHIATRIC: mood stable  LYMPHATIC: no new lymphadenopathy  HEME: no bleeding or easy bruisability  NEURO: no numbness, weakness, headaches         Physical Exam:     Temp:  [97  F (36.1  C)] 97  F (36.1  C)  Pulse:  [87] 87  Resp:  [16] 16  BP: (120)/(78) 120/78  SpO2:  [99 %] 99 %  Wt Readings from Last 4 Encounters:   03/06/19 58.6 kg (129 lb 3.2 oz)   01/07/19 59.4 kg (131 lb)     Constitutional:   Awake, alert and in no apparent distress     Eyes:   Nonicteric, MORIAH     ENT:    Trachea is midline. No gross neck abnormalities, poor dentition 2/2 childhood.     Neck:   Supple without supraclavicular or cervical lymphadenopathy     Lungs:   Good air flow.  No crackles. No rhonchi.  No wheezes.     Cardiovascular:   Normal S1 and S2.  RRR.  No murmur, gallop or rub.  Radial, DP and PT pulses normal and symmetric     Abdomen:   NABS, soft, nontender, nondistended.  No HSM.     Musculoskeletal:   No edema.      Neurologic:   Alert and conversant. Cranial nerves  intact.       Skin:   Warm, dry.  No rash on limited exam.           Current Laboratory Data:   All laboratory and imaging data reviewed.    No results found for this or any previous visit (from the past 24  hour(s)).         Previous Pulmonary Function Testing   No results found for: 20001  No results found for: 20002  No results found for: 20003  No results found for: 20015  No results found for: 20016  No results found for: 20027  No results found for: 20028  No results found for: 20029  No results found for: 20079  No results found for: 20080  No results found for: 20081  No results found for: 20335  No results found for: 20105  No results found for: 20053  No results found for: 20054  No results found for: 20055         Previous Chest Imaging   CT scans from 2014 and recently interpreted by me.  Both have approx 6-7 mm RLL nodule.  Stable.  Small granulomata.         Previous Cardiology Imaging   No results found for this or any previous visit (from the past 8760 hour(s)).         Again, thank you for allowing me to participate in the care of your patient.      Sincerely,    Berto Mckay MD

## 2019-06-21 LAB
RADIOLOGIST FLAGS: NORMAL
RADIOLOGIST FLAGS: NORMAL

## 2019-06-26 ENCOUNTER — PRE VISIT (OUTPATIENT)
Dept: OPHTHALMOLOGY | Facility: CLINIC | Age: 64
End: 2019-06-26

## 2019-06-26 ENCOUNTER — RECORDS - HEALTHEAST (OUTPATIENT)
Dept: LAB | Facility: CLINIC | Age: 64
End: 2019-06-26

## 2019-06-26 LAB
ALBUMIN SERPL-MCNC: 4.1 G/DL (ref 3.5–5)
ALP SERPL-CCNC: 118 U/L (ref 45–120)
ALT SERPL W P-5'-P-CCNC: 12 U/L (ref 0–45)
ANION GAP SERPL CALCULATED.3IONS-SCNC: 11 MMOL/L (ref 5–18)
AST SERPL W P-5'-P-CCNC: 18 U/L (ref 0–40)
BILIRUB SERPL-MCNC: 0.5 MG/DL (ref 0–1)
BUN SERPL-MCNC: 19 MG/DL (ref 8–22)
CALCIUM SERPL-MCNC: 9.3 MG/DL (ref 8.5–10.5)
CHLORIDE BLD-SCNC: 105 MMOL/L (ref 98–107)
CO2 SERPL-SCNC: 24 MMOL/L (ref 22–31)
CREAT SERPL-MCNC: 1.07 MG/DL (ref 0.6–1.1)
GFR SERPL CREATININE-BSD FRML MDRD: 51 ML/MIN/1.73M2
GLUCOSE BLD-MCNC: 90 MG/DL (ref 70–125)
POTASSIUM BLD-SCNC: 4.1 MMOL/L (ref 3.5–5)
PROT SERPL-MCNC: 7.2 G/DL (ref 6–8)
SODIUM SERPL-SCNC: 140 MMOL/L (ref 136–145)
TSH SERPL DL<=0.005 MIU/L-ACNC: 1.11 UIU/ML (ref 0.3–5)

## 2019-06-26 NOTE — TELEPHONE ENCOUNTER
FUTURE VISIT INFORMATION      FUTURE VISIT INFORMATION:    Date: 7/9/19    Time: 2:20pm    [place] [facility] [place]FORMATION:    Referring provider:  Self    Reason for visit/diagnosis  CEX  RECORDS REQUESTED FROM:       No records to collect

## 2019-07-09 ENCOUNTER — OFFICE VISIT (OUTPATIENT)
Dept: OPHTHALMOLOGY | Facility: CLINIC | Age: 64
End: 2019-07-09
Payer: MEDICARE

## 2019-07-09 DIAGNOSIS — H25.13 NUCLEAR SCLEROTIC CATARACT OF BOTH EYES: Primary | ICD-10-CM

## 2019-07-09 DIAGNOSIS — H52.03 HYPERMETROPIA OF BOTH EYES: ICD-10-CM

## 2019-07-09 DIAGNOSIS — H53.022 REFRACTIVE AMBLYOPIA OF LEFT EYE: ICD-10-CM

## 2019-07-09 ASSESSMENT — TONOMETRY
IOP_METHOD: ICARE
OS_IOP_MMHG: 13
OD_IOP_MMHG: 13

## 2019-07-09 ASSESSMENT — REFRACTION_MANIFEST
OD_CYLINDER: SPHERE
OD_ADD: +2.25
OS_AXIS: 060
OD_SPHERE: +1.00
OS_SPHERE: +1.00
METHOD_AUTOREFRACTION: 1
OS_CYLINDER: +0.25
OS_SPHERE: +0.75
OS_CYLINDER: +1.50
OS_ADD: +2.25
OS_AXIS: 060
OD_SPHERE: +1.00
OD_CYLINDER: SPHERE

## 2019-07-09 ASSESSMENT — VISUAL ACUITY
OD_SC: 20/40
OD_SC+: +2
OS_SC: 20/40
METHOD: SNELLEN - LINEAR
OS_SC+: -2

## 2019-07-09 ASSESSMENT — REFRACTION_WEARINGRX
SPECS_TYPE: OTCR
OD_SPHERE: +3.25
OS_SPHERE: +3.25

## 2019-07-09 ASSESSMENT — CONF VISUAL FIELD
OD_NORMAL: 1
METHOD: COUNTING FINGERS
OS_NORMAL: 1

## 2019-07-09 ASSESSMENT — CUP TO DISC RATIO
OD_RATIO: 0.2
OS_RATIO: 0.25

## 2019-07-09 ASSESSMENT — SLIT LAMP EXAM - LIDS
COMMENTS: NORMAL
COMMENTS: NORMAL

## 2019-07-09 ASSESSMENT — EXTERNAL EXAM - RIGHT EYE: OD_EXAM: NORMAL

## 2019-07-09 ASSESSMENT — EXTERNAL EXAM - LEFT EYE: OS_EXAM: NORMAL

## 2019-07-09 NOTE — PROGRESS NOTES
HPI  Farrah Rausch is a 64 year old female here for comprehensive eye exam.  Has mild blurry vision both eyes with current glasses, rarely wears distance bifocal but does wear +3.25 reading glasses.   Denies eye pain or irritation.  Glare bothering her in the car when coming outside after being indoors, particularly after shopping at [employer]. Patient denies eye pain or irritation. Does not use any eye drops. Declined refraction today.  Has been dilated in the past at [facility].    PMH: hypertension   POH: Glasses for hypermetropia, no surgery, no trauma  Oc Meds: none  FH: Denies any glaucoma, age related macular degeneration, or other known eye diseases         Assessment & Plan     (H52.03) Hypermetropia of both eyes  (primary encounter diagnosis)  Comment: mild  Plan: manifest refraction done and prescription for glasses given      (H25.13) Nuclear sclerotic cataract of both eyes - Both Eyes  Comment: early, not visually significant, counseled patient that it could cause mild glare/halos and refractive changes over time that can be compensated with new glasses   Glare reported after shopping under certain overhead lights sounds more like migraine or ocular migraine than glare from cataracts  Plan: new prescription given, observe     (H53.022) Refractive amblyopia of left eye - Left Eye  Comment: patient states left eye has never been as good as right eye but to history of patching or strabismus  Symmetric cataracts, no other eye pathology  Plan: follow       -----------------------------------------------------------------------------------       Patient disposition:   Return in about 1 year (around 7/9/2020) for Comprehensive Exam. or patient to call sooner as needed.      Complete documentation of historical and exam elements from today's encounter can be found in the full encounter summary report (not reduplicated in this progress note). I personally obtained the chief complaint(s) and history of present  illness.  I have confirmed and edited as necessary the CC, HPI, PMH/PSH, social history, FMH, ROS, and exam/neuro findings as obtained by the technician or others. I have examined this patient myself and I personally viewed the image(s) and studies listed above and the documentation reflects my findings and interpretation.  I formulated and edited as necessary the assessment and plan and discussed the findings and management plan with the patient and family.     Mariangel White MD

## 2019-07-09 NOTE — NURSING NOTE
Chief Complaints and History of Present Illnesses   Patient presents with     Annual Eye Exam     Chief Complaint(s) and History of Present Illness(es)     Annual Eye Exam     Laterality: both eyes    Onset: gradual    Onset: years ago    Severity: severe    Frequency: infrequently    Course: stable    Associated symptoms: glare    Treatments tried: no treatments    Pain scale: 0/10              Comments     Glare bothering her in the car when coming outside after being indoors. Patient denies eye pain or irritation. Does not use any eye drops.    [place]ill[place] COT 2:25 PM July 9, 2019

## 2019-10-27 ENCOUNTER — RECORDS - HEALTHEAST (OUTPATIENT)
Dept: ADMINISTRATIVE | Facility: OTHER | Age: 64
End: 2019-10-27

## 2019-10-28 ENCOUNTER — RECORDS - HEALTHEAST (OUTPATIENT)
Dept: ADMINISTRATIVE | Facility: OTHER | Age: 64
End: 2019-10-28

## 2021-05-25 ENCOUNTER — RECORDS - HEALTHEAST (OUTPATIENT)
Dept: ADMINISTRATIVE | Facility: CLINIC | Age: 66
End: 2021-05-25